# Patient Record
Sex: MALE | Race: WHITE | NOT HISPANIC OR LATINO | Employment: STUDENT | URBAN - METROPOLITAN AREA
[De-identification: names, ages, dates, MRNs, and addresses within clinical notes are randomized per-mention and may not be internally consistent; named-entity substitution may affect disease eponyms.]

---
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
## 2020-01-08 DIAGNOSIS — R11.10 VOMITING, INTRACTABILITY OF VOMITING NOT SPECIFIED, PRESENCE OF NAUSEA NOT SPECIFIED, UNSPECIFIED VOMITING TYPE: ICD-10-CM

## 2020-01-08 RX ORDER — ONDANSETRON HYDROCHLORIDE 8 MG/1
TABLET, FILM COATED ORAL
Qty: 30 TABLET | Refills: 0 | Status: SHIPPED | OUTPATIENT
Start: 2020-01-08 | End: 2020-09-23

## 2020-02-04 ENCOUNTER — TELEPHONE (OUTPATIENT)
Dept: PEDIATRIC GASTROENTEROLOGY | Facility: CLINIC | Age: 20
End: 2020-02-04

## 2020-02-04 NOTE — TELEPHONE ENCOUNTER
----- Message from Ed Hernandez sent at 2/4/2020  3:57 PM CST -----  Contact: Mom [phone]  Type:  Needs Medical Advice    Who Called: Mom     Would the patient rather a call back or a response via [facility]? Call back     Best Call Back Number: [phone]    Additional Information: Mom [phone]---calling to spk with the nurse regarding the pt having a stomach episode last week and the pt needs a doctor excuse to make up a Quiz. Mom states that it was 01/30/20 and it can be faxed over to [phone] Attn: Hernandez

## 2020-08-04 ENCOUNTER — TELEPHONE (OUTPATIENT)
Dept: PEDIATRICS | Facility: CLINIC | Age: 20
End: 2020-08-04

## 2020-08-04 NOTE — TELEPHONE ENCOUNTER
Pt is behind on vaccines. appt requested for well check tomorrow with dr Mccabe and to catch up on vaccines. Informed Dr duff is pcp but wants to see dr Reeves scheduled for tomorrow.

## 2020-08-04 NOTE — TELEPHONE ENCOUNTER
----- Message from Tamar Best sent at 8/4/2020  4:24 PM CDT -----  Contact: Mom Lesly Goetz mother  is requesting a copy of his shot records and she would like to stop by and pick them up tomorrow.  Call back at [phone] (home).  Thanks

## 2020-08-05 ENCOUNTER — OFFICE VISIT (OUTPATIENT)
Dept: PEDIATRICS | Facility: CLINIC | Age: 20
End: 2020-08-05
Payer: COMMERCIAL

## 2020-08-05 VITALS
HEIGHT: 65 IN | BODY MASS INDEX: 19.1 KG/M2 | WEIGHT: 114.63 LBS | RESPIRATION RATE: 14 BRPM | DIASTOLIC BLOOD PRESSURE: 71 MMHG | SYSTOLIC BLOOD PRESSURE: 121 MMHG | TEMPERATURE: 99 F | HEART RATE: 65 BPM

## 2020-08-05 DIAGNOSIS — Z00.00 WELL ADULT HEALTH CHECK: Primary | ICD-10-CM

## 2020-08-05 PROCEDURE — 99395 PR PREVENTIVE VISIT,EST,18-39: ICD-10-PCS | Mod: 25,S$GLB,, | Performed by: PEDIATRICS

## 2020-08-05 PROCEDURE — 87491 CHLMYD TRACH DNA AMP PROBE: CPT

## 2020-08-05 PROCEDURE — 90734 MENINGOCOCCAL CONJUGATE VACCINE 4-VALENT IM (MENACTRA): ICD-10-PCS | Mod: S$GLB,,, | Performed by: PEDIATRICS

## 2020-08-05 PROCEDURE — 86580 TB INTRADERMAL TEST: CPT | Mod: S$GLB,,, | Performed by: PEDIATRICS

## 2020-08-05 PROCEDURE — 90472 IMMUNIZATION ADMIN EACH ADD: CPT | Mod: S$GLB,,, | Performed by: PEDIATRICS

## 2020-08-05 PROCEDURE — 90651 9VHPV VACCINE 2/3 DOSE IM: CPT | Mod: S$GLB,,, | Performed by: PEDIATRICS

## 2020-08-05 PROCEDURE — 99999 PR PBB SHADOW E&M-EST. PATIENT-LVL V: ICD-10-PCS | Mod: PBBFAC,,, | Performed by: PEDIATRICS

## 2020-08-05 PROCEDURE — 90744 HEPATITIS B VACCINE PEDIATRIC / ADOLESCENT 3-DOSE IM: ICD-10-PCS | Mod: S$GLB,,, | Performed by: PEDIATRICS

## 2020-08-05 PROCEDURE — 90734 MENACWYD/MENACWYCRM VACC IM: CPT | Mod: S$GLB,,, | Performed by: PEDIATRICS

## 2020-08-05 PROCEDURE — 90472 HPV VACCINE 9-VALENT 3 DOSE IM: ICD-10-PCS | Mod: S$GLB,,, | Performed by: PEDIATRICS

## 2020-08-05 PROCEDURE — 90744 HEPB VACC 3 DOSE PED/ADOL IM: CPT | Mod: S$GLB,,, | Performed by: PEDIATRICS

## 2020-08-05 PROCEDURE — 99999 PR PBB SHADOW E&M-EST. PATIENT-LVL V: CPT | Mod: PBBFAC,,, | Performed by: PEDIATRICS

## 2020-08-05 PROCEDURE — 99395 PREV VISIT EST AGE 18-39: CPT | Mod: 25,S$GLB,, | Performed by: PEDIATRICS

## 2020-08-05 PROCEDURE — 86580 POCT TB SKIN TEST: ICD-10-PCS | Mod: S$GLB,,, | Performed by: PEDIATRICS

## 2020-08-05 PROCEDURE — 90471 MENINGOCOCCAL CONJUGATE VACCINE 4-VALENT IM (MENACTRA): ICD-10-PCS | Mod: S$GLB,,, | Performed by: PEDIATRICS

## 2020-08-05 PROCEDURE — 90471 IMMUNIZATION ADMIN: CPT | Mod: S$GLB,,, | Performed by: PEDIATRICS

## 2020-08-05 PROCEDURE — 90651 HPV VACCINE 9-VALENT 3 DOSE IM: ICD-10-PCS | Mod: S$GLB,,, | Performed by: PEDIATRICS

## 2020-08-05 NOTE — PATIENT INSTRUCTIONS

## 2020-08-05 NOTE — PROGRESS NOTES
Here for 21 yo well check with parent  Vivino.   ALL:none  MEDS:none  IMM:UTD, no adverse reaction  PMH: problem list reviewed  FH:no sudden cardiac death  LEAD & TB risk: negative  Home: lives with family, feels safe at home, no violence  Education: [employer] 2nd year  Diet: good appetite, variety of foods  Dental: regular dental visits.   Driving: yes, seatbelted.   Drugs/Etoh/Tobacco: vaping   Sexuality: condoms  ROS   GEN:sleeps well, no fever or wt loss   SKIN:no infection, rash, bruising or swelling   HEENT:hears and sees well, no eye, ear, nose d/c or pain, no ST, neck injury, pain or masses   CHEST:normal breathing, no cough or CP with exertion   CV:no fatigue, cyanosis, dizziness, palpitations   ABD:nl BMs; no vomiting,no diarrhea,no pain    :nl urination, no dysuria, blood or frequency   GYN:no genital problems   MS:nl movements and gait, no swelling or pain   NEURO:no HA, weakness, incoordination, concussion Hx or spells   PSYCH:no behavior problem, depression, anxiety  PHYSICAL:nl VS(see RN note). See Growth Chart.   GEN: alert, active, cooperative.   SKIN:no rash, pallor, bruising or edema   HEAD:NCAT   EYE:EOMI, PERRLA, clear conjunctiva   EAR:clear canals, nl pinnae and TMs   NOSE:patent, no d/c, midline septum   MOUTH:nl teeth and gums, clear pharynx   NECK:nl ROM, no mass or thyromegaly   CHEST:nl chest wall, resp effort, clear BBS   CV:RRR, no murmur, nl S1S2, no edema   ABD:nl BS, ND, soft, NT; no HSM, mass    :nl anatomy, no mass or hernia    MS:nl ROM, no deformity or instability, nl gait, no scoliosis, no CCE   NEURO:nl tone and strength    IMP: well teen, normal growth & development  PLAN:menactra booster, hep B, HPV #2 IM. Needs tb skin test.   Object. vision: PASS. Object. hear: PASS.    GUIDANCE: teen issues and safety discussed  Interpretive conference conducted.   Immunizations reviewed.  F/U annually & prn

## 2020-08-09 LAB
C TRACH DNA SPEC QL NAA+PROBE: NOT DETECTED
N GONORRHOEA DNA SPEC QL NAA+PROBE: NOT DETECTED

## 2020-08-10 ENCOUNTER — TELEPHONE (OUTPATIENT)
Dept: PEDIATRICS | Facility: CLINIC | Age: 20
End: 2020-08-10

## 2020-08-10 NOTE — TELEPHONE ENCOUNTER
----- Message from Crys Mccabe MD sent at 8/9/2020  8:20 PM CDT -----  Please let unter know his std test is normal. thanksdrfiona

## 2020-08-11 ENCOUNTER — TELEPHONE (OUTPATIENT)
Dept: PEDIATRICS | Facility: CLINIC | Age: 20
End: 2020-08-11

## 2020-08-11 NOTE — TELEPHONE ENCOUNTER
----- Message from Nehemias Diaz LPN sent at 8/11/2020  5:10 PM CDT -----  Regarding: FW: pt    ----- Message -----  From: Gilberto Clement  Sent: 8/11/2020   4:49 PM CDT  To: Eliot GILLIAM Staff  Subject: pt                                               Type:  Patient Returning Call    Who Called:  pt  Who Left Message for Patient:  Elle JOYNER  Does the patient know what this is regarding?:  no  Best Call Back Number:  [phone]  Additional Information:

## 2020-08-12 ENCOUNTER — TELEPHONE (OUTPATIENT)
Dept: PEDIATRICS | Facility: CLINIC | Age: 20
End: 2020-08-12

## 2020-08-12 NOTE — TELEPHONE ENCOUNTER
----- Message from Elizabeth Bucio sent at 8/12/2020 10:15 AM CDT -----  Contact: Patient [phone]  Patient stated that they missed a call from Bethany Quispe LPN     Please call and advise.    Thank You

## 2020-08-12 NOTE — TELEPHONE ENCOUNTER
----- Message from Avila Grant sent at 8/12/2020 10:00 AM CDT -----  Contact: patient  Type:  Patient Returning Call    Who Called:  patient  Who Left Message for Patient:  Elle  Does the patient know what this is regarding?:  yes  Best Call Back Number:  [phone]  Additional Information:  requesting a call back

## 2021-05-07 ENCOUNTER — CLINICAL SUPPORT (OUTPATIENT)
Dept: URGENT CARE | Facility: CLINIC | Age: 21
End: 2021-05-07
Payer: COMMERCIAL

## 2021-05-07 DIAGNOSIS — Z20.822 COVID-19 RULED OUT: Primary | ICD-10-CM

## 2021-05-07 LAB
CTP QC/QA: YES
SARS-COV-2 RDRP RESP QL NAA+PROBE: NEGATIVE

## 2021-05-07 PROCEDURE — U0002: ICD-10-PCS | Mod: QW,S$GLB,, | Performed by: FAMILY MEDICINE

## 2021-05-07 PROCEDURE — U0002 COVID-19 LAB TEST NON-CDC: HCPCS | Mod: QW,S$GLB,, | Performed by: FAMILY MEDICINE

## 2021-05-07 PROCEDURE — 99211 PR OFFICE/OUTPT VISIT, EST, LEVL I: ICD-10-PCS | Mod: S$GLB,CS,, | Performed by: FAMILY MEDICINE

## 2021-05-07 PROCEDURE — 99211 OFF/OP EST MAY X REQ PHY/QHP: CPT | Mod: S$GLB,CS,, | Performed by: FAMILY MEDICINE

## 2022-01-12 ENCOUNTER — OFFICE VISIT (OUTPATIENT)
Dept: FAMILY MEDICINE | Facility: CLINIC | Age: 22
End: 2022-01-12
Payer: COMMERCIAL

## 2022-01-12 VITALS
HEART RATE: 75 BPM | SYSTOLIC BLOOD PRESSURE: 120 MMHG | TEMPERATURE: 98 F | OXYGEN SATURATION: 96 % | DIASTOLIC BLOOD PRESSURE: 82 MMHG

## 2022-01-12 DIAGNOSIS — J02.9 SORE THROAT: ICD-10-CM

## 2022-01-12 DIAGNOSIS — U07.1 COVID-19: Primary | ICD-10-CM

## 2022-01-12 PROBLEM — R62.51 POOR WEIGHT GAIN (0-17): Status: RESOLVED | Noted: 2017-11-13 | Resolved: 2022-01-12

## 2022-01-12 PROBLEM — R11.0 NAUSEA WITHOUT VOMITING: Status: RESOLVED | Noted: 2017-11-13 | Resolved: 2022-01-12

## 2022-01-12 PROBLEM — R10.9 ABDOMINAL PAIN: Status: RESOLVED | Noted: 2017-12-07 | Resolved: 2022-01-12

## 2022-01-12 LAB
CTP QC/QA: YES
CTP QC/QA: YES
S PYO RRNA THROAT QL PROBE: NEGATIVE
SARS-COV-2 RDRP RESP QL NAA+PROBE: POSITIVE

## 2022-01-12 PROCEDURE — 87880 STREP A ASSAY W/OPTIC: CPT | Mod: QW,S$GLB,, | Performed by: FAMILY MEDICINE

## 2022-01-12 PROCEDURE — 1159F PR MEDICATION LIST DOCUMENTED IN MEDICAL RECORD: ICD-10-PCS | Mod: CPTII,S$GLB,, | Performed by: FAMILY MEDICINE

## 2022-01-12 PROCEDURE — 3074F SYST BP LT 130 MM HG: CPT | Mod: CPTII,S$GLB,, | Performed by: FAMILY MEDICINE

## 2022-01-12 PROCEDURE — 99203 PR OFFICE/OUTPT VISIT, NEW, LEVL III, 30-44 MIN: ICD-10-PCS | Mod: S$GLB,,, | Performed by: FAMILY MEDICINE

## 2022-01-12 PROCEDURE — 3079F DIAST BP 80-89 MM HG: CPT | Mod: CPTII,S$GLB,, | Performed by: FAMILY MEDICINE

## 2022-01-12 PROCEDURE — U0002: ICD-10-PCS | Mod: QW,S$GLB,, | Performed by: FAMILY MEDICINE

## 2022-01-12 PROCEDURE — 99999 PR PBB SHADOW E&M-EST. PATIENT-LVL III: ICD-10-PCS | Mod: PBBFAC,,, | Performed by: FAMILY MEDICINE

## 2022-01-12 PROCEDURE — U0002 COVID-19 LAB TEST NON-CDC: HCPCS | Mod: QW,S$GLB,, | Performed by: FAMILY MEDICINE

## 2022-01-12 PROCEDURE — 99999 PR PBB SHADOW E&M-EST. PATIENT-LVL III: CPT | Mod: PBBFAC,,, | Performed by: FAMILY MEDICINE

## 2022-01-12 PROCEDURE — 1160F RVW MEDS BY RX/DR IN RCRD: CPT | Mod: CPTII,S$GLB,, | Performed by: FAMILY MEDICINE

## 2022-01-12 PROCEDURE — 1159F MED LIST DOCD IN RCRD: CPT | Mod: CPTII,S$GLB,, | Performed by: FAMILY MEDICINE

## 2022-01-12 PROCEDURE — 3074F PR MOST RECENT SYSTOLIC BLOOD PRESSURE < 130 MM HG: ICD-10-PCS | Mod: CPTII,S$GLB,, | Performed by: FAMILY MEDICINE

## 2022-01-12 PROCEDURE — 3079F PR MOST RECENT DIASTOLIC BLOOD PRESSURE 80-89 MM HG: ICD-10-PCS | Mod: CPTII,S$GLB,, | Performed by: FAMILY MEDICINE

## 2022-01-12 PROCEDURE — 1160F PR REVIEW ALL MEDS BY PRESCRIBER/CLIN PHARMACIST DOCUMENTED: ICD-10-PCS | Mod: CPTII,S$GLB,, | Performed by: FAMILY MEDICINE

## 2022-01-12 PROCEDURE — 99203 OFFICE O/P NEW LOW 30 MIN: CPT | Mod: S$GLB,,, | Performed by: FAMILY MEDICINE

## 2022-01-12 PROCEDURE — 87880 POCT RAPID STREP A: ICD-10-PCS | Mod: QW,S$GLB,, | Performed by: FAMILY MEDICINE

## 2022-01-12 NOTE — PROGRESS NOTES
Subjective:       Patient ID: Gio Hodge is a 21 y.o. male.    Chief Complaint: Sore Throat     Here today for an acute visit.  He is a patient of Dr. Strickland (Peds), new to me today and will need a new PCP.  He is c/o sore throat, slight nasal congestion and headache x 2 days.  Denies fever.  Has not been vacinated for COVID.  No obvious sick contacts.    Review of Systems   Constitutional: Negative for appetite change, fatigue and fever.   HENT: Positive for congestion and sore throat. Negative for sneezing.    Respiratory: Negative for shortness of breath and wheezing.    Cardiovascular: Negative for chest pain and palpitations.   Gastrointestinal: Negative for abdominal pain, constipation, diarrhea, nausea and vomiting.   Genitourinary: Negative for difficulty urinating, dysuria, frequency and hematuria.   Neurological: Negative for dizziness, syncope, weakness and headaches.   Psychiatric/Behavioral: Negative for agitation, behavioral problems and confusion. The patient is not nervous/anxious.        Objective:      Vitals:    01/12/22 0927   BP: 120/82   BP Location: Left arm   Patient Position: Sitting   BP Method: Medium (Manual)   Pulse: 75   Temp: 97.9 °F (36.6 °C)   SpO2: 96%      Physical Exam  Constitutional:       General: He is not in acute distress.     Comments: Seen in person in car in parking lot of clinic   HENT:      Mouth/Throat:      Mouth: Mucous membranes are moist.      Pharynx: Posterior oropharyngeal erythema present.   Cardiovascular:      Rate and Rhythm: Normal rate and regular rhythm.      Heart sounds: Normal heart sounds. No murmur heard.      Pulmonary:      Effort: Pulmonary effort is normal. No respiratory distress.      Breath sounds: Normal breath sounds. No wheezing, rhonchi or rales.   Lymphadenopathy:      Cervical: Cervical adenopathy present.   Skin:     General: Skin is warm and dry.   Neurological:      General: No focal deficit present.      Mental Status: He is  alert.   Psychiatric:         Mood and Affect: Mood normal.         Behavior: Behavior normal.         Thought Content: Thought content normal.         Assessment:       1. COVID-19    2. Sore throat        Plan:       COVID-19    Sore throat  -     POCT Rapid Strep A  -     POCT COVID-19 Rapid Screening    Discussed COVID precautions.  Self-isolate for 10 days as he is unvaccinated.  Symptomatic treatment.      Medication List with Changes/Refills   Current Medications    ONDANSETRON (ZOFRAN) 8 MG TABLET    TAKE 1 TABLET BY MOUTH EVERY 8 HOURS AS NEEDED FOR NAUSEA    RANITIDINE (ZANTAC) 150 MG TABLET    Take 1 tablet (150 mg total) by mouth 2 (two) times daily.   Discontinued Medications    HYOSCYAMINE (ANASPAZ,LEVSIN) 0.125 MG TAB    TAKE 1 TABLET BY MOUTH EVERY 4 HOURS AS NEEDED    OLOPATADINE (PATANOL) 0.1 % OPHTHALMIC SOLUTION    Place 1 drop into both eyes 2 (two) times daily.

## 2022-02-25 ENCOUNTER — OFFICE VISIT (OUTPATIENT)
Dept: FAMILY MEDICINE | Facility: CLINIC | Age: 22
End: 2022-02-25
Payer: COMMERCIAL

## 2022-02-25 VITALS
WEIGHT: 107.56 LBS | HEART RATE: 73 BPM | BODY MASS INDEX: 17.92 KG/M2 | OXYGEN SATURATION: 98 % | HEIGHT: 65 IN | RESPIRATION RATE: 18 BRPM | SYSTOLIC BLOOD PRESSURE: 116 MMHG | DIASTOLIC BLOOD PRESSURE: 66 MMHG

## 2022-02-25 DIAGNOSIS — R63.6 UNDERWEIGHT: ICD-10-CM

## 2022-02-25 DIAGNOSIS — Z00.00 WELL ADULT EXAM: Primary | ICD-10-CM

## 2022-02-25 PROCEDURE — 3078F PR MOST RECENT DIASTOLIC BLOOD PRESSURE < 80 MM HG: ICD-10-PCS | Mod: CPTII,S$GLB,, | Performed by: FAMILY MEDICINE

## 2022-02-25 PROCEDURE — 99999 PR PBB SHADOW E&M-EST. PATIENT-LVL III: CPT | Mod: PBBFAC,,, | Performed by: FAMILY MEDICINE

## 2022-02-25 PROCEDURE — 3008F BODY MASS INDEX DOCD: CPT | Mod: CPTII,S$GLB,, | Performed by: FAMILY MEDICINE

## 2022-02-25 PROCEDURE — 3074F PR MOST RECENT SYSTOLIC BLOOD PRESSURE < 130 MM HG: ICD-10-PCS | Mod: CPTII,S$GLB,, | Performed by: FAMILY MEDICINE

## 2022-02-25 PROCEDURE — 3074F SYST BP LT 130 MM HG: CPT | Mod: CPTII,S$GLB,, | Performed by: FAMILY MEDICINE

## 2022-02-25 PROCEDURE — 1160F RVW MEDS BY RX/DR IN RCRD: CPT | Mod: CPTII,S$GLB,, | Performed by: FAMILY MEDICINE

## 2022-02-25 PROCEDURE — 1159F MED LIST DOCD IN RCRD: CPT | Mod: CPTII,S$GLB,, | Performed by: FAMILY MEDICINE

## 2022-02-25 PROCEDURE — 99395 PR PREVENTIVE VISIT,EST,18-39: ICD-10-PCS | Mod: S$GLB,,, | Performed by: FAMILY MEDICINE

## 2022-02-25 PROCEDURE — 1159F PR MEDICATION LIST DOCUMENTED IN MEDICAL RECORD: ICD-10-PCS | Mod: CPTII,S$GLB,, | Performed by: FAMILY MEDICINE

## 2022-02-25 PROCEDURE — 3078F DIAST BP <80 MM HG: CPT | Mod: CPTII,S$GLB,, | Performed by: FAMILY MEDICINE

## 2022-02-25 PROCEDURE — 3008F PR BODY MASS INDEX (BMI) DOCUMENTED: ICD-10-PCS | Mod: CPTII,S$GLB,, | Performed by: FAMILY MEDICINE

## 2022-02-25 PROCEDURE — 99395 PREV VISIT EST AGE 18-39: CPT | Mod: S$GLB,,, | Performed by: FAMILY MEDICINE

## 2022-02-25 PROCEDURE — 99999 PR PBB SHADOW E&M-EST. PATIENT-LVL III: ICD-10-PCS | Mod: PBBFAC,,, | Performed by: FAMILY MEDICINE

## 2022-02-25 PROCEDURE — 1160F PR REVIEW ALL MEDS BY PRESCRIBER/CLIN PHARMACIST DOCUMENTED: ICD-10-PCS | Mod: CPTII,S$GLB,, | Performed by: FAMILY MEDICINE

## 2022-02-25 NOTE — PROGRESS NOTES
"Subjective:       Patient ID: Gio Hodge is a 21 y.o. male.    Chief Complaint: Preventative Health Care (Physical)    Patient here today for annual well adult exam.  No complaints.  Tries to eat a healthy diet.  Exercises regularly.    Immunizations: Tdap 2017  Last Lab Work: 2016  Colon Ca screening: due at 45  Prostate Ca Screening: Due at 50    He has been having difficulty gaining weight.  He has been eating move and working out.    Review of Systems   Constitutional: Negative for appetite change, fatigue and fever.   HENT: Negative for congestion, sneezing and sore throat.    Respiratory: Negative for cough, shortness of breath and wheezing.    Cardiovascular: Negative for chest pain and palpitations.   Gastrointestinal: Negative for abdominal pain, constipation, diarrhea, nausea and vomiting.   Genitourinary: Negative for difficulty urinating, dysuria, frequency and hematuria.   Neurological: Negative for dizziness, syncope, weakness and headaches.   Psychiatric/Behavioral: Negative for agitation, behavioral problems and confusion. The patient is not nervous/anxious.        Objective:      Vitals:    02/25/22 1326   BP: 116/66   Pulse: 73   Resp: 18   SpO2: 98%   Weight: 48.8 kg (107 lb 9.4 oz)   Height: 5' 5" (1.651 m)      Physical Exam  Constitutional:       General: He is not in acute distress.  Cardiovascular:      Rate and Rhythm: Normal rate and regular rhythm.      Heart sounds: Normal heart sounds. No murmur heard.  Pulmonary:      Effort: Pulmonary effort is normal. No respiratory distress.      Breath sounds: Normal breath sounds. No wheezing, rhonchi or rales.   Musculoskeletal:         General: No swelling.   Skin:     General: Skin is warm and dry.   Neurological:      General: No focal deficit present.      Mental Status: He is alert.   Psychiatric:         Mood and Affect: Mood normal.         Behavior: Behavior normal.         Thought Content: Thought content normal.         Assessment: "       1. Well adult exam    2. Underweight        Plan:       Well adult exam  -     Comprehensive Metabolic Panel; Future; Expected date: 02/25/2022  -     TSH; Future; Expected date: 02/25/2022  1. Continue to work on dietary improvements (decrease overall calorie intake, decrease sugar and carb intake, decrease animal protein intake)  2. Continue to exercise at least 30-40 minutes, 3 times per week  3. Immunizations were discussed and were up to date  4. Preventative exams were discussed and up to date  5.   Underweight  -     Comprehensive Metabolic Panel; Future; Expected date: 02/25/2022  -     TSH; Future; Expected date: 02/25/2022          Medication List with Changes/Refills   Current Medications    ONDANSETRON (ZOFRAN) 8 MG TABLET    TAKE 1 TABLET BY MOUTH EVERY 8 HOURS AS NEEDED FOR NAUSEA   Discontinued Medications    RANITIDINE (ZANTAC) 150 MG TABLET    Take 1 tablet (150 mg total) by mouth 2 (two) times daily.

## 2023-07-14 ENCOUNTER — TELEPHONE (OUTPATIENT)
Dept: FAMILY MEDICINE | Facility: CLINIC | Age: 23
End: 2023-07-14
Payer: COMMERCIAL

## 2023-07-14 ENCOUNTER — TELEPHONE (OUTPATIENT)
Dept: PEDIATRICS | Facility: CLINIC | Age: 23
End: 2023-07-14
Payer: COMMERCIAL

## 2023-07-14 NOTE — TELEPHONE ENCOUNTER
Spoke with patient's mother and she stated that the patient's peds doctor printed out immunization records this morning.

## 2023-07-14 NOTE — TELEPHONE ENCOUNTER
----- Message from Cal Gonzales sent at 7/14/2023  7:30 AM CDT -----  Regarding: Call back  Contact: Mother Lesly  Type:  Needs Medical Advice    Who Called: Pt's Mother Lesly    Would the patient rather a call back or a response via [facility]? Call back  Best Call Back Number: [phone]     Additional Information: Sts she is very upset, she has been calling all week about his immunization and NO on had tried to call her back yet.  Please advise -- Thank you

## 2023-07-14 NOTE — TELEPHONE ENCOUNTER
----- Message from Rohit Pettit sent at 7/14/2023  8:02 AM CDT -----  Contact: self  Type: Needs Medical Advice  Who Called: Patient    Best Call Back Number: [phone]  Additional Information: Pt states she need shots records states she has been calling all week and hasn't gotten a  reply back. Pt needs shot records sent to er or she will pick them up. Claims she has to get her shot records today.  Thanks

## 2023-07-14 NOTE — TELEPHONE ENCOUNTER
----- Message from Rohit Pettit sent at 7/14/2023  8:02 AM CDT -----  Contact: self  Type: Needs Medical Advice  Who Called: Patient    Best Call Back Number: [phone]  Additional Information: Pt states she need shots records states she has been calling all week and hasn't gotten a  reply back. Pt needs shot records sent to er or she will pick them up. Claims she has to get her shot records today.  Thanks

## 2023-10-09 ENCOUNTER — TELEPHONE (OUTPATIENT)
Dept: FAMILY MEDICINE | Facility: CLINIC | Age: 23
End: 2023-10-09
Payer: COMMERCIAL

## 2023-10-09 NOTE — TELEPHONE ENCOUNTER
----- Message from Sue Ames, Patient Care Assistant sent at 10/9/2023  2:59 PM CDT -----  Regarding: appointment  Contact: ptalix Grace  Type:  Sooner Appointment Request    Caller is requesting a sooner appointment.  Caller declined first available appointment listed below.  Caller will not accept being placed on the waitlist and is requesting a message be sent to doctor.    Name of Caller:  ptalix Grace    When is the first available appointment?  2023    Symptoms:  neck pain    Best Call Back Number:  [phone] (home)     Additional Information:  please call pt's mother Lesly to advise. Thanks!

## 2023-10-09 NOTE — TELEPHONE ENCOUNTER
Spoke with patients mother regarding pt.  Pts mom thinks that  pt may have strained his neck in computer class at school.  Its now causing pt to miss class due to pain being so bad..  scheduled pt appointment  on 10/11/2023 with andres at 1:20 due to larry being booked and saravanan being booked.

## 2023-10-09 NOTE — TELEPHONE ENCOUNTER
----- Message from Fiorella Perry sent at 10/9/2023  4:45 PM CDT -----  Regarding: Patient Returning Call  Contact: ANAHI - mother  Type:  Patient Returning Call        Who Called:  ANAHI - mother     Who Left Message for Patient: DAVEY    Does the patient know what this is regarding?  YES     Best Call Back Number:  [phone]    Additional Information:  Patient is returning nurse/Ma phone call   Please call back and advise. Thanks

## 2023-10-09 NOTE — TELEPHONE ENCOUNTER
----- Message from Fiorella Perry sent at 10/9/2023  4:45 PM CDT -----  Regarding: Patient Returning Call  Contact: ANAHI - mother  Type:  Patient Returning Call        Who Called:  ANAHI - mother     Who Left Message for Patient: DAVEY    Does the patient know what this is regarding?  YES     Best Call Back Number:  [phone]    Additional Information:  Patient is returning nurse/Ma phone call   Please call back and advise. Thanks

## 2023-11-07 ENCOUNTER — OFFICE VISIT (OUTPATIENT)
Dept: FAMILY MEDICINE | Facility: CLINIC | Age: 23
End: 2023-11-07
Payer: COMMERCIAL

## 2023-11-07 ENCOUNTER — TELEPHONE (OUTPATIENT)
Dept: FAMILY MEDICINE | Facility: CLINIC | Age: 23
End: 2023-11-07

## 2023-11-07 VITALS
TEMPERATURE: 98 F | OXYGEN SATURATION: 98 % | BODY MASS INDEX: 17.67 KG/M2 | HEART RATE: 77 BPM | HEIGHT: 65 IN | WEIGHT: 106.06 LBS | SYSTOLIC BLOOD PRESSURE: 126 MMHG | DIASTOLIC BLOOD PRESSURE: 84 MMHG

## 2023-11-07 DIAGNOSIS — R05.9 COUGH, UNSPECIFIED TYPE: Primary | ICD-10-CM

## 2023-11-07 DIAGNOSIS — J02.9 SORE THROAT: ICD-10-CM

## 2023-11-07 DIAGNOSIS — T78.40XA ALLERGY, INITIAL ENCOUNTER: ICD-10-CM

## 2023-11-07 PROCEDURE — 3008F BODY MASS INDEX DOCD: CPT | Mod: CPTII,S$GLB,, | Performed by: INTERNAL MEDICINE

## 2023-11-07 PROCEDURE — 1160F PR REVIEW ALL MEDS BY PRESCRIBER/CLIN PHARMACIST DOCUMENTED: ICD-10-PCS | Mod: CPTII,S$GLB,, | Performed by: INTERNAL MEDICINE

## 2023-11-07 PROCEDURE — 99999 PR PBB SHADOW E&M-EST. PATIENT-LVL III: CPT | Mod: PBBFAC,,, | Performed by: INTERNAL MEDICINE

## 2023-11-07 PROCEDURE — 87804 INFLUENZA ASSAY W/OPTIC: CPT | Mod: QW,S$GLB,, | Performed by: INTERNAL MEDICINE

## 2023-11-07 PROCEDURE — 99213 OFFICE O/P EST LOW 20 MIN: CPT | Mod: S$GLB,,, | Performed by: INTERNAL MEDICINE

## 2023-11-07 PROCEDURE — 3079F PR MOST RECENT DIASTOLIC BLOOD PRESSURE 80-89 MM HG: ICD-10-PCS | Mod: CPTII,S$GLB,, | Performed by: INTERNAL MEDICINE

## 2023-11-07 PROCEDURE — 99999 PR PBB SHADOW E&M-EST. PATIENT-LVL III: ICD-10-PCS | Mod: PBBFAC,,, | Performed by: INTERNAL MEDICINE

## 2023-11-07 PROCEDURE — 99213 PR OFFICE/OUTPT VISIT, EST, LEVL III, 20-29 MIN: ICD-10-PCS | Mod: S$GLB,,, | Performed by: INTERNAL MEDICINE

## 2023-11-07 PROCEDURE — 3074F SYST BP LT 130 MM HG: CPT | Mod: CPTII,S$GLB,, | Performed by: INTERNAL MEDICINE

## 2023-11-07 PROCEDURE — 3079F DIAST BP 80-89 MM HG: CPT | Mod: CPTII,S$GLB,, | Performed by: INTERNAL MEDICINE

## 2023-11-07 PROCEDURE — 87635: ICD-10-PCS | Mod: QW,S$GLB,, | Performed by: INTERNAL MEDICINE

## 2023-11-07 PROCEDURE — 3074F PR MOST RECENT SYSTOLIC BLOOD PRESSURE < 130 MM HG: ICD-10-PCS | Mod: CPTII,S$GLB,, | Performed by: INTERNAL MEDICINE

## 2023-11-07 PROCEDURE — 87804 POCT INFLUENZA A/B: ICD-10-PCS | Mod: QW,S$GLB,, | Performed by: INTERNAL MEDICINE

## 2023-11-07 PROCEDURE — 87635 SARS-COV-2 COVID-19 AMP PRB: CPT | Mod: QW,S$GLB,, | Performed by: INTERNAL MEDICINE

## 2023-11-07 PROCEDURE — 1159F PR MEDICATION LIST DOCUMENTED IN MEDICAL RECORD: ICD-10-PCS | Mod: CPTII,S$GLB,, | Performed by: INTERNAL MEDICINE

## 2023-11-07 PROCEDURE — 1159F MED LIST DOCD IN RCRD: CPT | Mod: CPTII,S$GLB,, | Performed by: INTERNAL MEDICINE

## 2023-11-07 PROCEDURE — 1160F RVW MEDS BY RX/DR IN RCRD: CPT | Mod: CPTII,S$GLB,, | Performed by: INTERNAL MEDICINE

## 2023-11-07 PROCEDURE — 3008F PR BODY MASS INDEX (BMI) DOCUMENTED: ICD-10-PCS | Mod: CPTII,S$GLB,, | Performed by: INTERNAL MEDICINE

## 2023-11-07 RX ORDER — PREDNISONE 20 MG/1
TABLET ORAL
Qty: 5 TABLET | Refills: 0 | Status: SHIPPED | OUTPATIENT
Start: 2023-11-07 | End: 2023-11-11

## 2023-11-07 NOTE — PROGRESS NOTES
Patient ID: Gio Hodge is a 23 y.o. male.    Chief Complaint: Nasal Congestion and Cough        Assessment and Plan      1. Cough, unspecified type  - POCT COVID-19 Rapid Screening; Future  - POCT Influenza A/B Rapid Antigen    2. Sore throat  - predniSONE (DELTASONE) 20 MG tablet; Take 2 tablets (40 mg total) by mouth once daily for 1 day, THEN 1.5 tablets (30 mg total) once daily for 1 day, THEN 1 tablet (20 mg total) once daily for 1 day, THEN 0.5 tablets (10 mg total) once daily for 1 day.  Dispense: 5 tablet; Refill: 0    3. Allergy, initial encounter  - predniSONE (DELTASONE) 20 MG tablet; Take 2 tablets (40 mg total) by mouth once daily for 1 day, THEN 1.5 tablets (30 mg total) once daily for 1 day, THEN 1 tablet (20 mg total) once daily for 1 day, THEN 0.5 tablets (10 mg total) once daily for 1 day.  Dispense: 5 tablet; Refill: 0          HPI     Cough and congestion. Cough at night wed to Sunday. Headaches and nausea. Grogginess and headaches. Similar symptoms with allergies. Fever Fri/sat night (100-101). Brother was sick (flu). Taking ibuprofen for headaches. In school at [employer]. Throat is sore and he has a speech coming up for school.    Review of Systems   Constitutional:  Negative for fever.   HENT:  Positive for congestion and sore throat.    Respiratory:  Positive for cough. Negative for shortness of breath.    Cardiovascular:  Negative for chest pain.   Gastrointestinal:  Negative for abdominal pain.   Neurological:  Positive for headaches.        Objective     Vitals:    11/07/23 1450   BP: 126/84   Pulse: 77   Temp: 98 °F (36.7 °C)     Wt Readings from Last 3 Encounters:   11/07/23 1450 48.1 kg (106 lb 0.7 oz)   02/25/22 1326 48.8 kg (107 lb 9.4 oz)   08/05/20 1405 52 kg (114 lb 10.2 oz)      Body mass index is 17.65 kg/m².   Physical Exam  HENT:      Mouth/Throat:      Pharynx: Posterior oropharyngeal erythema present.   Cardiovascular:      Rate and Rhythm: Normal rate and regular  rhythm.      Heart sounds: No murmur heard.     No gallop.   Pulmonary:      Breath sounds: Normal breath sounds. No wheezing or rhonchi.   Abdominal:      Palpations: Abdomen is soft.      Tenderness: There is no abdominal tenderness.          Medication List with Changes/Refills   New Medications    PREDNISONE (DELTASONE) 20 MG TABLET    Take 2 tablets (40 mg total) by mouth once daily for 1 day, THEN 1.5 tablets (30 mg total) once daily for 1 day, THEN 1 tablet (20 mg total) once daily for 1 day, THEN 0.5 tablets (10 mg total) once daily for 1 day.   Current Medications    ONDANSETRON (ZOFRAN) 8 MG TABLET    TAKE 1 TABLET BY MOUTH EVERY 8 HOURS AS NEEDED FOR NAUSEA       I personally reviewed past medical, family and social history.    Patient Active Problem List   Diagnosis    Allergy    Cyclical vomiting with nausea    Bone spur of left foot    Chronic toe pain, left foot    Esophagitis determined by endoscopy

## 2023-11-07 NOTE — LETTER
November 7, 2023      [facility]  [address]  [place] [address]  Phone: [phone]  Fax: [phone]       Patient: Gio Hodge   YOB: 2000  Date of Visit: 11/07/2023    To Whom It May Concern:    Erasmo Hodge  was at [facility] on 11/07/2023. Please excuse the patient for the dates of 11/2/23-11/7/23. The patient may return to school on 11/8/23 with no restrictions. If you have any questions or concerns, or if I can be of further assistance, please do not hesitate to contact me.    Sincerely,          Tristen Rodriguez MA

## 2023-11-08 LAB
CTP QC/QA: YES
CTP QC/QA: YES
FLUAV AG NPH QL: NEGATIVE
FLUBV AG NPH QL: NEGATIVE
SARS-COV-2 RDRP RESP QL NAA+PROBE: NEGATIVE

## 2023-11-08 NOTE — TELEPHONE ENCOUNTER
----- Message from Patricia Bell sent at 11/8/2023  9:25 AM CST -----  Contact: paresh  Type:  Pharmacy Calling to Clarify an RX    Name of Caller:  Paresh  Pharmacy Name:    [id number] - [address] [facility] [address] [facility]  [address] [place] [address]  Phone: [phone] Fax: [phone]  Prescription Name:  predniSONE (DELTASONE) 20 MG tablet  What do they need to clarify?:  verify directions  Best Call Back Number:  [phone]  Additional Information: